# Patient Record
Sex: FEMALE | Race: WHITE | Employment: FULL TIME | URBAN - METROPOLITAN AREA
[De-identification: names, ages, dates, MRNs, and addresses within clinical notes are randomized per-mention and may not be internally consistent; named-entity substitution may affect disease eponyms.]

---

## 2018-05-24 PROBLEM — O47.9 UTERINE CONTRACTIONS DURING PREGNANCY: Status: ACTIVE | Noted: 2018-05-24

## 2018-05-24 PROBLEM — Z34.93 THIRD TRIMESTER PREGNANCY: Status: ACTIVE | Noted: 2018-05-24

## 2022-03-18 PROBLEM — Z34.93 THIRD TRIMESTER PREGNANCY: Status: ACTIVE | Noted: 2018-05-24

## 2022-03-20 PROBLEM — O47.9 UTERINE CONTRACTIONS DURING PREGNANCY: Status: ACTIVE | Noted: 2018-05-24

## 2023-04-23 DIAGNOSIS — N64.4 MASTODYNIA: Primary | ICD-10-CM

## 2023-04-24 DIAGNOSIS — N64.4 MASTODYNIA: Primary | ICD-10-CM

## 2023-05-02 ENCOUNTER — HOSPITAL ENCOUNTER (OUTPATIENT)
Dept: MAMMOGRAPHY | Age: 38
Discharge: HOME | End: 2023-05-02
Attending: OBSTETRICS & GYNECOLOGY
Payer: COMMERCIAL

## 2023-05-02 DIAGNOSIS — N63.10 LUMP OF BREAST, RIGHT: ICD-10-CM

## 2023-05-02 DIAGNOSIS — N64.4 MASTODYNIA: ICD-10-CM

## 2023-05-02 PROCEDURE — 77062 BREAST TOMOSYNTHESIS BI: CPT

## 2025-04-11 RX ORDER — BUPROPION HYDROCHLORIDE 300 MG/1
300 TABLET ORAL EVERY MORNING
COMMUNITY

## 2025-04-11 RX ORDER — PANTOPRAZOLE SODIUM 40 MG/1
40 TABLET, DELAYED RELEASE ORAL
COMMUNITY

## 2025-04-11 RX ORDER — ESCITALOPRAM OXALATE 10 MG/1
10 TABLET ORAL DAILY
COMMUNITY

## 2025-04-11 NOTE — PERIOP NOTE
[facility]  Ambulatory Surgery Unit  [address] [place] [address]  [address]  Pre-operative Instructions    Surgery/Procedure Date  Wednesday 4/16            Tentative Arrival Time TBD      1. On the day of your surgery/procedure, please report to the Ambulatory Surgery Unit Registration Desk and sign in at your designated time. The Ambulatory Surgery Unit is located in [facility] on the [facility] side of the [facility] across from the [facility]. Please have all of your health insurance cards, co-payment, and a photo ID.    **TWO adults may accompany you the day of the procedure.  We have limited seating available.      2. You cannot be dropped off for surgery.  Please make arrangements for a responsible adult friend or family member to remain on the hospital campus during your procedure, and drive you home, as you should not drive for 24 hours following anesthesia. Make arrangements for a responsible adult to stay with you for at least the first 24 hours after your surgery.    3. Do not have anything to eat or drink (including water, gum, mints, coffee, juice) after 11:59 PM on Tuesday 4/15. This may not apply to medications prescribed by your physician.  (Please note below the special instructions with medications to take the morning of surgery, if applicable.)    You can brush your teeth am of surgery      4. We recommend you do not drink any alcoholic beverages for 24 hours before and after your surgery.    5. Contact your surgeon’s office for instructions on the following medications: non-steroidal anti-inflammatory drugs (i.e. Advil, Aleve), vitamins, and supplements. (Some surgeon’s will want you to stop these medications prior to surgery and others may allow you to take them)   **If you are currently taking Plavix, Coumadin, Aspirin and/or other blood-thinning agents, contact your surgeon for instructions.** Your surgeon will partner with the physician

## 2025-04-14 ENCOUNTER — ANESTHESIA EVENT (OUTPATIENT)
Facility: HOSPITAL | Age: 40
End: 2025-04-14
Payer: COMMERCIAL

## 2025-04-14 NOTE — PERIOP NOTE
Chart reviewed by Dr. Turcios, anesthesiologist, as per same , patient is okay to proceed with planned procedure.    10:16- patient said that she have an  appointment with Dr. Willis, cardiologist tomorrow at 10am.

## 2025-04-15 NOTE — PERIOP NOTE
1622 - left voice mail for Dr Desai nurse (due to Hannah is out today) to r/c to [facility] about getting letter from Hannah Gao faxed to [facility] that was written and should be in patient chart from 4/14/2025.     169 - Nurse Crystal with Dr Desai office called and stated they are unable to find that note but that Hannah Gao would be back tomorrow and they will fax over the note in the am.

## 2025-04-16 ENCOUNTER — ANESTHESIA (OUTPATIENT)
Facility: HOSPITAL | Age: 40
End: 2025-04-16
Payer: COMMERCIAL

## 2025-04-16 ENCOUNTER — HOSPITAL ENCOUNTER (OUTPATIENT)
Facility: HOSPITAL | Age: 40
Setting detail: OUTPATIENT SURGERY
Discharge: HOME | End: 2025-04-16
Attending: OBSTETRICS & GYNECOLOGY | Admitting: OBSTETRICS & GYNECOLOGY
Payer: COMMERCIAL

## 2025-04-16 VITALS
BODY MASS INDEX: 40.92 KG/M2 | TEMPERATURE: 97.5 F | HEART RATE: 67 BPM | HEIGHT: 68 IN | DIASTOLIC BLOOD PRESSURE: 79 MMHG | RESPIRATION RATE: 17 BRPM | OXYGEN SATURATION: 100 % | WEIGHT: 270 LBS | SYSTOLIC BLOOD PRESSURE: 115 MMHG

## 2025-04-16 LAB — HCG UR QL: NEGATIVE

## 2025-04-16 PROCEDURE — 3600000004 HC SURGERY LEVEL 4 BASE: Performed by: OBSTETRICS & GYNECOLOGY

## 2025-04-16 PROCEDURE — 6370000000 HC RX 637 (ALT 250 FOR IP)

## 2025-04-16 PROCEDURE — 7100000010 HC PHASE II RECOVERY - FIRST 15 MIN: Performed by: OBSTETRICS & GYNECOLOGY

## 2025-04-16 PROCEDURE — 88305 TISSUE EXAM BY PATHOLOGIST: CPT

## 2025-04-16 PROCEDURE — 3700000001 HC ADD 15 MINUTES (ANESTHESIA): Performed by: OBSTETRICS & GYNECOLOGY

## 2025-04-16 PROCEDURE — 2709999900 HC NON-CHARGEABLE SUPPLY: Performed by: OBSTETRICS & GYNECOLOGY

## 2025-04-16 PROCEDURE — 7100000000 HC PACU RECOVERY - FIRST 15 MIN: Performed by: OBSTETRICS & GYNECOLOGY

## 2025-04-16 PROCEDURE — 3600000014 HC SURGERY LEVEL 4 ADDTL 15MIN: Performed by: OBSTETRICS & GYNECOLOGY

## 2025-04-16 PROCEDURE — 6360000002 HC RX W HCPCS: Performed by: OBSTETRICS & GYNECOLOGY

## 2025-04-16 PROCEDURE — 2500000003 HC RX 250 WO HCPCS

## 2025-04-16 PROCEDURE — 7100000001 HC PACU RECOVERY - ADDTL 15 MIN: Performed by: OBSTETRICS & GYNECOLOGY

## 2025-04-16 PROCEDURE — 2580000003 HC RX 258: Performed by: STUDENT IN AN ORGANIZED HEALTH CARE EDUCATION/TRAINING PROGRAM

## 2025-04-16 PROCEDURE — 81025 URINE PREGNANCY TEST: CPT

## 2025-04-16 PROCEDURE — 3700000000 HC ANESTHESIA ATTENDED CARE: Performed by: OBSTETRICS & GYNECOLOGY

## 2025-04-16 PROCEDURE — 6360000002 HC RX W HCPCS

## 2025-04-16 RX ORDER — SODIUM CHLORIDE 9 MG/ML
INJECTION, SOLUTION INTRAVENOUS PRN
Status: DISCONTINUED | OUTPATIENT
Start: 2025-04-16 | End: 2025-04-16 | Stop reason: HOSPADM

## 2025-04-16 RX ORDER — GLUCAGON 1 MG/ML
1 KIT INJECTION PRN
Status: DISCONTINUED | OUTPATIENT
Start: 2025-04-16 | End: 2025-04-16 | Stop reason: HOSPADM

## 2025-04-16 RX ORDER — ONDANSETRON 2 MG/ML
4 INJECTION INTRAMUSCULAR; INTRAVENOUS
Status: DISCONTINUED | OUTPATIENT
Start: 2025-04-16 | End: 2025-04-16 | Stop reason: HOSPADM

## 2025-04-16 RX ORDER — MIDAZOLAM HYDROCHLORIDE 1 MG/ML
INJECTION, SOLUTION INTRAMUSCULAR; INTRAVENOUS
Status: DISCONTINUED | OUTPATIENT
Start: 2025-04-16 | End: 2025-04-16 | Stop reason: SDUPTHER

## 2025-04-16 RX ORDER — NALOXONE HYDROCHLORIDE 0.4 MG/ML
INJECTION, SOLUTION INTRAMUSCULAR; INTRAVENOUS; SUBCUTANEOUS PRN
Status: DISCONTINUED | OUTPATIENT
Start: 2025-04-16 | End: 2025-04-16 | Stop reason: HOSPADM

## 2025-04-16 RX ORDER — HYDROMORPHONE HYDROCHLORIDE 1 MG/ML
0.5 INJECTION, SOLUTION INTRAMUSCULAR; INTRAVENOUS; SUBCUTANEOUS EVERY 5 MIN PRN
Status: DISCONTINUED | OUTPATIENT
Start: 2025-04-16 | End: 2025-04-16 | Stop reason: HOSPADM

## 2025-04-16 RX ORDER — ONDANSETRON 2 MG/ML
INJECTION INTRAMUSCULAR; INTRAVENOUS
Status: DISCONTINUED | OUTPATIENT
Start: 2025-04-16 | End: 2025-04-16 | Stop reason: SDUPTHER

## 2025-04-16 RX ORDER — IBUPROFEN 600 MG/1
600 TABLET, FILM COATED ORAL 3 TIMES DAILY PRN
Qty: 30 TABLET | Refills: 0 | Status: SHIPPED | OUTPATIENT
Start: 2025-04-16

## 2025-04-16 RX ORDER — ACETAMINOPHEN 500 MG
1000 TABLET ORAL ONCE
Status: COMPLETED | OUTPATIENT
Start: 2025-04-16 | End: 2025-04-16

## 2025-04-16 RX ORDER — FENTANYL CITRATE 50 UG/ML
INJECTION, SOLUTION INTRAMUSCULAR; INTRAVENOUS
Status: DISCONTINUED | OUTPATIENT
Start: 2025-04-16 | End: 2025-04-16 | Stop reason: SDUPTHER

## 2025-04-16 RX ORDER — SODIUM CHLORIDE 0.9 % (FLUSH) 0.9 %
5-40 SYRINGE (ML) INJECTION EVERY 12 HOURS SCHEDULED
Status: DISCONTINUED | OUTPATIENT
Start: 2025-04-16 | End: 2025-04-16 | Stop reason: HOSPADM

## 2025-04-16 RX ORDER — DEXAMETHASONE SODIUM PHOSPHATE 4 MG/ML
INJECTION, SOLUTION INTRA-ARTICULAR; INTRALESIONAL; INTRAMUSCULAR; INTRAVENOUS; SOFT TISSUE
Status: DISCONTINUED | OUTPATIENT
Start: 2025-04-16 | End: 2025-04-16 | Stop reason: SDUPTHER

## 2025-04-16 RX ORDER — KETOROLAC TROMETHAMINE 30 MG/ML
INJECTION, SOLUTION INTRAMUSCULAR; INTRAVENOUS
Status: DISCONTINUED | OUTPATIENT
Start: 2025-04-16 | End: 2025-04-16 | Stop reason: SDUPTHER

## 2025-04-16 RX ORDER — LIDOCAINE HYDROCHLORIDE 20 MG/ML
INJECTION, SOLUTION EPIDURAL; INFILTRATION; INTRACAUDAL; PERINEURAL
Status: DISCONTINUED | OUTPATIENT
Start: 2025-04-16 | End: 2025-04-16 | Stop reason: SDUPTHER

## 2025-04-16 RX ORDER — DOCUSATE SODIUM 100 MG/1
100 CAPSULE, LIQUID FILLED ORAL 2 TIMES DAILY
Qty: 60 CAPSULE | Refills: 0 | Status: SHIPPED | OUTPATIENT
Start: 2025-04-16 | End: 2025-05-16

## 2025-04-16 RX ORDER — SODIUM CHLORIDE 0.9 % (FLUSH) 0.9 %
5-40 SYRINGE (ML) INJECTION PRN
Status: DISCONTINUED | OUTPATIENT
Start: 2025-04-16 | End: 2025-04-16 | Stop reason: HOSPADM

## 2025-04-16 RX ORDER — PROCHLORPERAZINE EDISYLATE 5 MG/ML
5 INJECTION INTRAMUSCULAR; INTRAVENOUS
Status: DISCONTINUED | OUTPATIENT
Start: 2025-04-16 | End: 2025-04-16 | Stop reason: HOSPADM

## 2025-04-16 RX ORDER — LIDOCAINE HYDROCHLORIDE 10 MG/ML
INJECTION, SOLUTION EPIDURAL; INFILTRATION; INTRACAUDAL; PERINEURAL PRN
Status: DISCONTINUED | OUTPATIENT
Start: 2025-04-16 | End: 2025-04-16 | Stop reason: ALTCHOICE

## 2025-04-16 RX ORDER — DEXMEDETOMIDINE HYDROCHLORIDE 100 UG/ML
INJECTION, SOLUTION INTRAVENOUS
Status: DISCONTINUED | OUTPATIENT
Start: 2025-04-16 | End: 2025-04-16 | Stop reason: SDUPTHER

## 2025-04-16 RX ORDER — FENTANYL CITRATE 50 UG/ML
25 INJECTION, SOLUTION INTRAMUSCULAR; INTRAVENOUS EVERY 5 MIN PRN
Status: DISCONTINUED | OUTPATIENT
Start: 2025-04-16 | End: 2025-04-16 | Stop reason: HOSPADM

## 2025-04-16 RX ORDER — ACETAMINOPHEN 500 MG
TABLET ORAL
Status: COMPLETED
Start: 2025-04-16 | End: 2025-04-16

## 2025-04-16 RX ORDER — DEXTROSE MONOHYDRATE 100 MG/ML
INJECTION, SOLUTION INTRAVENOUS CONTINUOUS PRN
Status: DISCONTINUED | OUTPATIENT
Start: 2025-04-16 | End: 2025-04-16 | Stop reason: HOSPADM

## 2025-04-16 RX ORDER — SODIUM CHLORIDE, SODIUM LACTATE, POTASSIUM CHLORIDE, CALCIUM CHLORIDE 600; 310; 30; 20 MG/100ML; MG/100ML; MG/100ML; MG/100ML
INJECTION, SOLUTION INTRAVENOUS CONTINUOUS
Status: DISCONTINUED | OUTPATIENT
Start: 2025-04-16 | End: 2025-04-16 | Stop reason: HOSPADM

## 2025-04-16 RX ORDER — IPRATROPIUM BROMIDE AND ALBUTEROL SULFATE 2.5; .5 MG/3ML; MG/3ML
1 SOLUTION RESPIRATORY (INHALATION)
Status: DISCONTINUED | OUTPATIENT
Start: 2025-04-16 | End: 2025-04-16 | Stop reason: HOSPADM

## 2025-04-16 RX ORDER — LIDOCAINE HYDROCHLORIDE 10 MG/ML
1 INJECTION, SOLUTION EPIDURAL; INFILTRATION; INTRACAUDAL; PERINEURAL
Status: DISCONTINUED | OUTPATIENT
Start: 2025-04-16 | End: 2025-04-16 | Stop reason: HOSPADM

## 2025-04-16 RX ORDER — PROPOFOL 10 MG/ML
INJECTION, EMULSION INTRAVENOUS
Status: DISCONTINUED | OUTPATIENT
Start: 2025-04-16 | End: 2025-04-16 | Stop reason: SDUPTHER

## 2025-04-16 RX ADMIN — PROPOFOL 30 MG: 10 INJECTION, EMULSION INTRAVENOUS at 12:47

## 2025-04-16 RX ADMIN — DEXMEDETOMIDINE 6 MCG: 100 INJECTION, SOLUTION INTRAVENOUS at 12:14

## 2025-04-16 RX ADMIN — FENTANYL CITRATE 25 MCG: 50 INJECTION, SOLUTION INTRAMUSCULAR; INTRAVENOUS at 12:21

## 2025-04-16 RX ADMIN — ONDANSETRON 4 MG: 2 INJECTION, SOLUTION INTRAMUSCULAR; INTRAVENOUS at 12:14

## 2025-04-16 RX ADMIN — FENTANYL CITRATE 25 MCG: 50 INJECTION, SOLUTION INTRAMUSCULAR; INTRAVENOUS at 12:33

## 2025-04-16 RX ADMIN — KETOROLAC TROMETHAMINE 30 MG: 30 INJECTION, SOLUTION INTRAMUSCULAR; INTRAVENOUS at 12:54

## 2025-04-16 RX ADMIN — FENTANYL CITRATE 25 MCG: 50 INJECTION, SOLUTION INTRAMUSCULAR; INTRAVENOUS at 12:28

## 2025-04-16 RX ADMIN — PROPOFOL 40 MG: 10 INJECTION, EMULSION INTRAVENOUS at 12:20

## 2025-04-16 RX ADMIN — MIDAZOLAM HYDROCHLORIDE 2 MG: 1 INJECTION, SOLUTION INTRAMUSCULAR; INTRAVENOUS at 12:14

## 2025-04-16 RX ADMIN — Medication 1000 MG: at 11:34

## 2025-04-16 RX ADMIN — FENTANYL CITRATE 25 MCG: 50 INJECTION, SOLUTION INTRAMUSCULAR; INTRAVENOUS at 12:16

## 2025-04-16 RX ADMIN — DEXAMETHASONE SODIUM PHOSPHATE 8 MG: 4 INJECTION, SOLUTION INTRAMUSCULAR; INTRAVENOUS at 12:21

## 2025-04-16 RX ADMIN — SODIUM CHLORIDE, SODIUM LACTATE, POTASSIUM CHLORIDE, AND CALCIUM CHLORIDE: .6; .31; .03; .02 INJECTION, SOLUTION INTRAVENOUS at 11:34

## 2025-04-16 RX ADMIN — PROPOFOL 150 MCG/KG/MIN: 10 INJECTION, EMULSION INTRAVENOUS at 12:21

## 2025-04-16 RX ADMIN — LIDOCAINE HYDROCHLORIDE 100 MG: 20 INJECTION, SOLUTION EPIDURAL; INFILTRATION; INTRACAUDAL; PERINEURAL at 12:18

## 2025-04-16 RX ADMIN — ACETAMINOPHEN 1000 MG: 500 TABLET ORAL at 11:34

## 2025-04-16 RX ADMIN — PROPOFOL 40 MG: 10 INJECTION, EMULSION INTRAVENOUS at 12:18

## 2025-04-16 ASSESSMENT — PAIN - FUNCTIONAL ASSESSMENT: PAIN_FUNCTIONAL_ASSESSMENT: 0-10

## 2025-04-16 NOTE — ANESTHESIA PRE PROCEDURE
Department of Anesthesiology  Preprocedure Note       Name:  Brandi Orellana   Age:  39 y.o.  :  1985                                          MRN:  [id number]         Date:  2025      Surgeon: Surgeon(s):  Martha Rosales MD    Procedure: Procedure(s):  HYSTEROSCOPY, DILATATION AND CURETTAGE, ENDOMETRAIL ABLATION, AND PAP SMEAR    Medications prior to admission:   Prior to Admission medications    Medication Sig Start Date End Date Taking? Authorizing Provider   buPROPion (WELLBUTRIN XL) 300 MG extended release tablet Take 1 tablet by mouth every morning   Yes Gabriel Sifuentes MD   escitalopram (LEXAPRO) 10 MG tablet Take 1 tablet by mouth daily   Yes Gabriel Sifuentes MD   pantoprazole (PROTONIX) 40 MG tablet Take 1 tablet by mouth every morning (before breakfast)   Yes ProviderGabriel MD       Current medications:    No current facility-administered medications for this encounter.     Current Outpatient Medications   Medication Sig Dispense Refill   • buPROPion (WELLBUTRIN XL) 300 MG extended release tablet Take 1 tablet by mouth every morning     • escitalopram (LEXAPRO) 10 MG tablet Take 1 tablet by mouth daily     • pantoprazole (PROTONIX) 40 MG tablet Take 1 tablet by mouth every morning (before breakfast)         Allergies:    Allergies   Allergen Reactions   • Cefaclor      Other reaction(s): Unknown (comments)  As a baby, unknown reaction       Problem List:    Patient Active Problem List   Diagnosis Code   • Term pregnancy Z34.90   • Third trimester pregnancy Z34.93   • Labor without complication O80   • Uterine contractions during pregnancy O47.9   • Pregnancy Z34.90       Past Medical History:        Diagnosis Date   • Depression    • GERD (gastroesophageal reflux disease)    • Palpitation     or fluttering, PCP making a referal to cardioloigst per pt 2025   • PONV (postoperative nausea and vomiting)        Past Surgical History:        Procedure Laterality Date   •

## 2025-04-16 NOTE — DISCHARGE INSTRUCTIONS
>>>You received Tylenol 1000mg prior to your surgery, you may take Tylenol (or pain medication containing Tylenol or Acetaminophen) in 6 hours at 5:30pm.    >>>You received Toradol during your surgery. You may not take any form of NSAIDS (non steroidal anti inflammatory drugs) such as Advil, Ibuprofen, Aleve, Motrin until 7pm.     Hysteroscopy With Dilation and Curettage: What to Expect at Home  Your Recovery     For a hysteroscopy, your doctor guides a lighted tube through your cervix and into your uterus. This helps the doctor see inside your uterus.  For a dilation and curettage (D&C), your doctor uses a curved tool, called a curette, to gently scrape tissue from your uterus.  After these procedures, you are likely to have a backache or cramps similar to menstrual cramps. Expect to pass small clots of blood from your vagina for the first few days. You may have light vaginal bleeding for several weeks after the D&C.  If the doctor filled your uterus with air, your belly may feel full. You may also have shoulder pain right after the procedure.  You will probably be able to go back to most of your normal activities in 1 or 2 days.  This care sheet gives you a general idea about how long it will take for you to recover. But each person recovers at a different pace. Follow the steps below to get better as quickly as possible.  How can you care for yourself at home?  Activity    Rest when you feel tired.     You will probably be able to return to work the day after the procedure. But it depends on what was done and the type of work you do.     You may have some light vaginal bleeding. Use sanitary pads until you stop bleeding. Using pads makes it easier to monitor your bleeding. Do not rinse your vagina with fluid (douche).     Ask your doctor when it is okay for you to have sex.   Diet    You can eat your normal diet. If your stomach is upset, try bland, low-fat foods like plain rice, broiled chicken, toast, and

## 2025-04-16 NOTE — OP NOTE
HYSTEROSCOPY FULL OP NOTE        DATE OF PROCEDURE:  04/16/25      PREOPERATIVE DIAGNOSIS:  Menorrhagia with regular cycle [N92.0]    POSTOPERATIVE DIAGNOSIS:  Same    PROCEDURE:  Pap smear, hysteroscopy, directed endometrial sampling, Novasure endometrial ablation    SURGEON:  Martha Rosales MD    ASSISTANT:  none    ANESTHESIA: MAC    EBL: minimal    FINDINGS: Proliferative endometrium, bilateral ostia seen.  At end of procedure good circumferential burn noted from ablation    Specimen:  Endometrial curettings    PROCEDURE: Patient was placed on the operating table in the supine position. Patient was placed under MAC and repositioned to dorsal lithotomy. She was prepped and draped in the usual fashion for vaginal surgery. Cervix was visualized with the aid of a Graves vaginal speculum and grasped with a single-tooth tenaculum. 10cc of 1% lidocaine was then injected at 4 and 8 o'clock to achieve a paracervical block in the usual fashion. The cervix was then easily dilated to 6mm.     The Myosure hysteroscope was then placed into the uterine cavity under direct visualization. Survey of the endometrial cavity revealed the above findings. The Myosure was device was then placed and all global sampling of the endometrial cavity was then completed.     The Novasure device was placed with uterine length of 4.5 cm and uterine width of 5.0 cm.  There was a successful cavity assessment followed by successful ablation.  The device was removed.  Repeat hysteroscopy revealed a good circumferential burn of all endometrial tissue.    Instruments were removed, and the cervix was made hemostatic with pressure. The patient went to the recovery room in satisfactory condition.    All counts were correct.

## 2025-04-16 NOTE — H&P
Print      Patient    Name PAM ALONZO (40yo, F) ID# [id number] Appt. Date/Time 2025 08:30AM    1985 Service Dept. [facility] [facility] [facility]   Provider ALAYNA WOLFF MD   Insurance Med Primary: [facility]PP[place]  Insurance # : [id number]  Policy/Group # : [id number]  Prescription: check now   Chief Complaint  follow up - gyn ultrasound, endometrial biopsy*  Patient's Care Team  Primary Care Provider: SON VASQUEZ PA: [address] [place] [address], Ph [phone], Fax [phone] NPI: [id number]   Patient's Pharmacies  [id number] ([facility]): [address], [place] [address], Ph [phone], Fax [phone]   Vitals    Ht: 5 ft 8 in (172.72 cm) 2025 07:43 am Wt: 274 lbs (124.28 kg) 2025 07:43 am BMI: 41.7 2025 07:43 am   BP: 124/82 2025 07:46 am       Measurements  None recorded.  Allergies    Reviewed Allergies   CECLOR: Rash (Mild)    Medications    Reviewed Medications    buPROPion HCL  mg 24 hr tablet, extended release 24   filled surescripts   escitalopram 5 mg tablet  TAKE 1 TABLET BY MOUTH EVERY DAY 02/10/25   filled surescripts   valACYclovir 1 gram tablet  TAKE 2 TABLETS BY MOUTH TWO TIMES A DAY TAKE ASAP AT SYMPTOM ONSET 02/10/25   filled surescripts   Problems  Reviewed Problems  Body mass index 30+ - obesity - Onset: 2025  Abnormal uterine bleeding - Onset: 2025  At high risk for malignant neoplasm of breast - Onset: 2025 -  TC 24.20%    Family History  Reviewed Family History  Maternal Aunt - Diabetes mellitus     - Hyperthyroidism   Maternal Grandmother - Hyperthyroidism     - Malignant tumor of breast  - great great maternal grandma   Mother - Hypothyroidism   Paternal Aunt - Malignant tumor of breast   Father - Myocardial infarction ( age: 49)   Social History  Reviewed Social History   Substance Use  Do you or have you ever smoked tobacco?: Never smoker  Do you or have

## 2025-04-16 NOTE — PERIOP NOTE
Brandi Orellana  1985  [id number]    Situation:  Verbal report given from: Tatiana JONES  Procedure: Procedure(s):  HYSTEROSCOPY, DILATATION AND CURETTAGE, ENDOMETRIAL ABLATION, AND PAP SMEAR    Background:    Preoperative diagnosis: Menorrhagia with regular cycle [N92.0]    Postoperative diagnosis: * No post-op diagnosis entered *    :  Dr. Rosales    Assistant(s): Circulator: Mikey Hein RN  Relief Scrub: Dwayne Gonsalez RN  Scrub Person First: Zuly Curry RN    Specimens:   ID Type Source Tests Collected by Time Destination   1 : Endometrial Curettings Tissue Endometrium SURGICAL PATHOLOGY Martha Rosales MD 4/16/2025 1251        Assessment:  Intra-procedure medications         Anesthesia gave intra-procedure sedation and medications, see anesthesia flow sheet     Intravenous fluids: LR@ KVO     Vital signs stable       Recommendation:    Permission to share finding with family

## 2025-04-16 NOTE — PERIOP NOTE
1308 Pt arrived to PACU.  Pt very drowsy. VSS.  NO drainage on peripad. Abd soft. Denies pain.     1330 Discharge instructions reviewed with . Pt awake.  Tolerating apple juice. Denies pain.     1345 Pt alert and oriented. Denies pain. No drainage on peripad. Pt meets discharge criteria.

## 2025-04-16 NOTE — PERIOP NOTE
Permission received to review discharge instructions and discuss private health information with  and will have someone with them after discharge   Patient states that family/friend will be with them for at least 24 hours following today's procedure.   Air Warming blanket placed on pt; turned on for comfort      phone in PACU

## 2025-04-17 NOTE — ANESTHESIA POSTPROCEDURE EVALUATION
Department of Anesthesiology  Postprocedure Note    Patient: Brandi Orellana  MRN: [id number]  YOB: 1985  Date of evaluation: 4/17/2025    Procedure Summary       Date: 04/16/25 Room / Location: \[facility]\"" ASU B2 / \[facility]\"" AMBULATORY OR    Anesthesia Start: 1214 Anesthesia Stop: 1310    Procedure: HYSTEROSCOPY, DILATATION AND CURETTAGE, ENDOMETRIAL ABLATION, AND PAP SMEAR (Uterus) Diagnosis:       Menorrhagia with regular cycle      (Menorrhagia with regular cycle [N92.0])    Surgeons: Martha Rosales MD Responsible Provider: Itz Egan MD    Anesthesia Type: MAC ASA Status: 2            Anesthesia Type: MAC    Magda Phase I: Magda Score: 10    Magda Phase II: Magda Score: 10    Anesthesia Post Evaluation    Patient location during evaluation: PACU  Patient participation: complete - patient participated  Level of consciousness: sleepy but conscious and responsive to verbal stimuli  Airway patency: patent  Nausea & Vomiting: no vomiting and no nausea  Cardiovascular status: blood pressure returned to baseline and hemodynamically stable  Respiratory status: acceptable  Hydration status: stable    No notable events documented.

## (undated) DEVICE — COVER LT HNDL PLAS RIG 1 PER PK

## (undated) DEVICE — D&C MRMC: Brand: MEDLINE INDUSTRIES, INC.

## (undated) DEVICE — GOWN,SIRUS,FABRNF,XL,20/CS: Brand: MEDLINE

## (undated) DEVICE — COVER,MAYO STAND,STERILE: Brand: MEDLINE

## (undated) DEVICE — GLOVE SURG SZ 65 THK91MIL LTX FREE SYN POLYISOPRENE

## (undated) DEVICE — GLOVE SURG SZ 6 L12IN FNGR THK79MIL GRN LTX FREE

## (undated) DEVICE — SET ENDOSCP SEAL HYSTEROSCOPE RIG OUTFLO CHN DISP MYOSURE